# Patient Record
Sex: MALE | Race: WHITE | Employment: FULL TIME | URBAN - METROPOLITAN AREA
[De-identification: names, ages, dates, MRNs, and addresses within clinical notes are randomized per-mention and may not be internally consistent; named-entity substitution may affect disease eponyms.]

---

## 2017-03-08 ENCOUNTER — HOSPITAL ENCOUNTER (OUTPATIENT)
Age: 57
Setting detail: SPECIMEN
Discharge: HOME | End: 2017-03-08
Payer: COMMERCIAL

## 2017-03-10 LAB — DERMATOLOGY PATHOLOGY REPORT: NORMAL

## 2017-08-02 ENCOUNTER — HOSPITAL ENCOUNTER (OUTPATIENT)
Age: 57
Setting detail: SPECIMEN
Discharge: HOME | End: 2017-08-02
Payer: COMMERCIAL

## 2017-08-03 LAB — DERMATOLOGY PATHOLOGY REPORT: NORMAL

## 2017-09-07 ENCOUNTER — OFFICE VISIT (OUTPATIENT)
Dept: FAMILY MEDICINE CLINIC | Age: 57
End: 2017-09-07
Payer: COMMERCIAL

## 2017-09-07 VITALS
BODY MASS INDEX: 29.62 KG/M2 | SYSTOLIC BLOOD PRESSURE: 118 MMHG | DIASTOLIC BLOOD PRESSURE: 68 MMHG | WEIGHT: 211.6 LBS | OXYGEN SATURATION: 98 % | HEART RATE: 85 BPM | HEIGHT: 71 IN

## 2017-09-07 DIAGNOSIS — E78.2 ELEVATED TRIGLYCERIDES WITH HIGH CHOLESTEROL: ICD-10-CM

## 2017-09-07 DIAGNOSIS — R53.83 FATIGUE, UNSPECIFIED TYPE: ICD-10-CM

## 2017-09-07 DIAGNOSIS — Z23 NEED FOR PROPHYLACTIC VACCINATION AGAINST DIPHTHERIA-TETANUS-PERTUSSIS (DTP): Primary | ICD-10-CM

## 2017-09-07 DIAGNOSIS — N52.9 VASCULOGENIC ERECTILE DYSFUNCTION, UNSPECIFIED VASCULOGENIC ERECTILE DYSFUNCTION TYPE: ICD-10-CM

## 2017-09-07 DIAGNOSIS — R51.9 NONINTRACTABLE HEADACHE, UNSPECIFIED CHRONICITY PATTERN, UNSPECIFIED HEADACHE TYPE: ICD-10-CM

## 2017-09-07 DIAGNOSIS — C44.91 BCC (BASAL CELL CARCINOMA OF SKIN): ICD-10-CM

## 2017-09-07 PROCEDURE — 90715 TDAP VACCINE 7 YRS/> IM: CPT | Performed by: FAMILY MEDICINE

## 2017-09-07 PROCEDURE — 99205 OFFICE O/P NEW HI 60 MIN: CPT | Performed by: FAMILY MEDICINE

## 2017-09-07 PROCEDURE — 90471 IMMUNIZATION ADMIN: CPT | Performed by: FAMILY MEDICINE

## 2017-09-07 RX ORDER — TADALAFIL 5 MG/1
5 TABLET ORAL PRN
Qty: 9 TABLET | Refills: 11 | Status: SHIPPED | OUTPATIENT
Start: 2017-09-07 | End: 2019-01-22

## 2017-09-07 ASSESSMENT — PATIENT HEALTH QUESTIONNAIRE - PHQ9
2. FEELING DOWN, DEPRESSED OR HOPELESS: 0
1. LITTLE INTEREST OR PLEASURE IN DOING THINGS: 0
SUM OF ALL RESPONSES TO PHQ QUESTIONS 1-9: 0
SUM OF ALL RESPONSES TO PHQ9 QUESTIONS 1 & 2: 0

## 2017-09-08 ENCOUNTER — HOSPITAL ENCOUNTER (OUTPATIENT)
Age: 57
Discharge: HOME | End: 2017-09-08
Payer: COMMERCIAL

## 2017-09-08 DIAGNOSIS — E78.2 ELEVATED TRIGLYCERIDES WITH HIGH CHOLESTEROL: ICD-10-CM

## 2017-09-08 DIAGNOSIS — R53.83 FATIGUE, UNSPECIFIED TYPE: ICD-10-CM

## 2017-09-08 LAB
ABSOLUTE EOS #: 0.4 K/UL (ref 0–0.4)
ABSOLUTE LYMPH #: 2.1 K/UL (ref 1–4.8)
ABSOLUTE MONO #: 0.7 K/UL (ref 0.1–1.2)
ALBUMIN SERPL-MCNC: 4.4 G/DL (ref 3.5–5.2)
ALBUMIN/GLOBULIN RATIO: 1.4 (ref 1–2.5)
ALP BLD-CCNC: 52 U/L (ref 40–129)
ALT SERPL-CCNC: 97 U/L (ref 5–41)
ANION GAP SERPL CALCULATED.3IONS-SCNC: 11 MMOL/L (ref 9–17)
AST SERPL-CCNC: 67 U/L
BASOPHILS # BLD: 1 %
BASOPHILS ABSOLUTE: 0 K/UL (ref 0–0.2)
BILIRUB SERPL-MCNC: 1.25 MG/DL (ref 0.3–1.2)
BUN BLDV-MCNC: 21 MG/DL (ref 6–20)
BUN/CREAT BLD: ABNORMAL (ref 9–20)
CALCIUM SERPL-MCNC: 9.5 MG/DL (ref 8.6–10.4)
CHLORIDE BLD-SCNC: 100 MMOL/L (ref 98–107)
CHOLESTEROL/HDL RATIO: 5.9
CHOLESTEROL: 205 MG/DL
CO2: 26 MMOL/L (ref 20–31)
CREAT SERPL-MCNC: 0.93 MG/DL (ref 0.7–1.2)
DIFFERENTIAL TYPE: NORMAL
EOSINOPHILS RELATIVE PERCENT: 6 %
GFR AFRICAN AMERICAN: >60 ML/MIN
GFR NON-AFRICAN AMERICAN: >60 ML/MIN
GFR SERPL CREATININE-BSD FRML MDRD: ABNORMAL ML/MIN/{1.73_M2}
GFR SERPL CREATININE-BSD FRML MDRD: ABNORMAL ML/MIN/{1.73_M2}
GLUCOSE BLD-MCNC: 138 MG/DL (ref 70–99)
HCT VFR BLD CALC: 46.7 % (ref 41–53)
HDLC SERPL-MCNC: 35 MG/DL
HEMOGLOBIN: 16.1 G/DL (ref 13.5–17.5)
LDL CHOLESTEROL: 109 MG/DL (ref 0–130)
LYMPHOCYTES # BLD: 30 %
MCH RBC QN AUTO: 32.1 PG (ref 26–34)
MCHC RBC AUTO-ENTMCNC: 34.4 G/DL (ref 31–37)
MCV RBC AUTO: 93.4 FL (ref 80–100)
MONOCYTES # BLD: 10 %
PDW BLD-RTO: 13.7 % (ref 12.5–15.4)
PLATELET # BLD: 236 K/UL (ref 140–450)
PLATELET ESTIMATE: NORMAL
PMV BLD AUTO: 9.5 FL (ref 6–12)
POTASSIUM SERPL-SCNC: 4.7 MMOL/L (ref 3.7–5.3)
RBC # BLD: 5 M/UL (ref 4.5–5.9)
RBC # BLD: NORMAL 10*6/UL
SEG NEUTROPHILS: 53 %
SEGMENTED NEUTROPHILS ABSOLUTE COUNT: 3.8 K/UL (ref 1.8–7.7)
SODIUM BLD-SCNC: 137 MMOL/L (ref 135–144)
TOTAL PROTEIN: 7.6 G/DL (ref 6.4–8.3)
TRIGL SERPL-MCNC: 306 MG/DL
TSH SERPL DL<=0.05 MIU/L-ACNC: 4.64 MIU/L (ref 0.3–5)
VLDLC SERPL CALC-MCNC: ABNORMAL MG/DL (ref 1–30)
WBC # BLD: 7 K/UL (ref 3.5–11)
WBC # BLD: NORMAL 10*3/UL

## 2017-09-08 PROCEDURE — 84443 ASSAY THYROID STIM HORMONE: CPT

## 2017-09-08 PROCEDURE — 36415 COLL VENOUS BLD VENIPUNCTURE: CPT

## 2017-09-08 PROCEDURE — 80053 COMPREHEN METABOLIC PANEL: CPT

## 2017-09-08 PROCEDURE — 80061 LIPID PANEL: CPT

## 2017-09-08 PROCEDURE — 85025 COMPLETE CBC W/AUTO DIFF WBC: CPT

## 2017-09-11 DIAGNOSIS — R73.09 ELEVATED GLUCOSE: ICD-10-CM

## 2017-09-11 DIAGNOSIS — R79.89 ELEVATED LFTS: Primary | ICD-10-CM

## 2017-09-13 DIAGNOSIS — R73.09 ELEVATED GLUCOSE: Primary | ICD-10-CM

## 2017-09-25 ENCOUNTER — TELEPHONE (OUTPATIENT)
Dept: FAMILY MEDICINE CLINIC | Age: 57
End: 2017-09-25

## 2017-09-26 ENCOUNTER — HOSPITAL ENCOUNTER (OUTPATIENT)
Age: 57
Discharge: HOME | End: 2017-09-26
Payer: COMMERCIAL

## 2017-09-26 DIAGNOSIS — R73.09 ELEVATED GLUCOSE: ICD-10-CM

## 2017-09-26 DIAGNOSIS — R79.89 ELEVATED LFTS: ICD-10-CM

## 2017-09-26 LAB
ALBUMIN SERPL-MCNC: 4.2 G/DL (ref 3.5–5.2)
ALBUMIN/GLOBULIN RATIO: 1.3 (ref 1–2.5)
ALP BLD-CCNC: 47 U/L (ref 40–129)
ALT SERPL-CCNC: 57 U/L (ref 5–41)
ANION GAP SERPL CALCULATED.3IONS-SCNC: 13 MMOL/L (ref 9–17)
AST SERPL-CCNC: 43 U/L
BILIRUB SERPL-MCNC: 1.7 MG/DL (ref 0.3–1.2)
BUN BLDV-MCNC: 16 MG/DL (ref 6–20)
BUN/CREAT BLD: ABNORMAL (ref 9–20)
CALCIUM SERPL-MCNC: 9.5 MG/DL (ref 8.6–10.4)
CHLORIDE BLD-SCNC: 102 MMOL/L (ref 98–107)
CO2: 24 MMOL/L (ref 20–31)
CREAT SERPL-MCNC: 1.01 MG/DL (ref 0.7–1.2)
ESTIMATED AVERAGE GLUCOSE: 140 MG/DL
GFR AFRICAN AMERICAN: >60 ML/MIN
GFR NON-AFRICAN AMERICAN: >60 ML/MIN
GFR SERPL CREATININE-BSD FRML MDRD: ABNORMAL ML/MIN/{1.73_M2}
GFR SERPL CREATININE-BSD FRML MDRD: ABNORMAL ML/MIN/{1.73_M2}
GLUCOSE BLD-MCNC: 135 MG/DL (ref 70–99)
HBA1C MFR BLD: 6.5 % (ref 4–6)
POTASSIUM SERPL-SCNC: 4.8 MMOL/L (ref 3.7–5.3)
SODIUM BLD-SCNC: 139 MMOL/L (ref 135–144)
TOTAL PROTEIN: 7.4 G/DL (ref 6.4–8.3)

## 2017-09-26 PROCEDURE — 83036 HEMOGLOBIN GLYCOSYLATED A1C: CPT

## 2017-09-26 PROCEDURE — 80053 COMPREHEN METABOLIC PANEL: CPT

## 2017-09-26 PROCEDURE — 36415 COLL VENOUS BLD VENIPUNCTURE: CPT

## 2017-09-27 PROBLEM — E11.9 TYPE 2 DIABETES MELLITUS WITHOUT COMPLICATION, WITHOUT LONG-TERM CURRENT USE OF INSULIN (HCC): Chronic | Status: ACTIVE | Noted: 2017-09-27

## 2017-09-28 DIAGNOSIS — E11.9 TYPE 2 DIABETES MELLITUS WITHOUT COMPLICATION, WITHOUT LONG-TERM CURRENT USE OF INSULIN (HCC): Primary | Chronic | ICD-10-CM

## 2017-09-29 ENCOUNTER — OFFICE VISIT (OUTPATIENT)
Dept: FAMILY MEDICINE CLINIC | Age: 57
End: 2017-09-29
Payer: COMMERCIAL

## 2017-09-29 ENCOUNTER — HOSPITAL ENCOUNTER (OUTPATIENT)
Age: 57
Setting detail: SPECIMEN
Discharge: HOME | End: 2017-09-29
Payer: COMMERCIAL

## 2017-09-29 VITALS
DIASTOLIC BLOOD PRESSURE: 88 MMHG | HEIGHT: 71 IN | SYSTOLIC BLOOD PRESSURE: 120 MMHG | BODY MASS INDEX: 29.6 KG/M2 | WEIGHT: 211.4 LBS | HEART RATE: 82 BPM | OXYGEN SATURATION: 98 %

## 2017-09-29 DIAGNOSIS — N40.1 BENIGN NON-NODULAR PROSTATIC HYPERPLASIA WITH LOWER URINARY TRACT SYMPTOMS: ICD-10-CM

## 2017-09-29 DIAGNOSIS — E11.9 TYPE 2 DIABETES MELLITUS WITHOUT COMPLICATION, WITHOUT LONG-TERM CURRENT USE OF INSULIN (HCC): Primary | ICD-10-CM

## 2017-09-29 DIAGNOSIS — M17.11 PRIMARY OSTEOARTHRITIS OF RIGHT KNEE: ICD-10-CM

## 2017-09-29 LAB — PROSTATE SPECIFIC ANTIGEN: 1.88 UG/L

## 2017-09-29 PROCEDURE — 99214 OFFICE O/P EST MOD 30 MIN: CPT | Performed by: FAMILY MEDICINE

## 2017-09-29 RX ORDER — DICLOFENAC SODIUM 75 MG/1
75 TABLET, DELAYED RELEASE ORAL 2 TIMES DAILY WITH MEALS
Qty: 60 TABLET | Refills: 11 | Status: SHIPPED | OUTPATIENT
Start: 2017-09-29 | End: 2019-01-22

## 2017-09-29 RX ORDER — TAMSULOSIN HYDROCHLORIDE 0.4 MG/1
0.4 CAPSULE ORAL DAILY
Qty: 30 CAPSULE | Refills: 11 | Status: SHIPPED | OUTPATIENT
Start: 2017-09-29 | End: 2019-01-22

## 2017-10-24 ENCOUNTER — HOSPITAL ENCOUNTER (OUTPATIENT)
Dept: DIABETES SERVICES | Age: 57
Setting detail: THERAPIES SERIES
Discharge: HOME | End: 2017-10-24
Payer: COMMERCIAL

## 2017-10-24 VITALS
HEART RATE: 70 BPM | WEIGHT: 210.76 LBS | HEIGHT: 70 IN | BODY MASS INDEX: 30.17 KG/M2 | SYSTOLIC BLOOD PRESSURE: 125 MMHG | DIASTOLIC BLOOD PRESSURE: 80 MMHG

## 2017-10-24 DIAGNOSIS — E11.9 TYPE 2 DIABETES MELLITUS WITHOUT COMPLICATION, WITHOUT LONG-TERM CURRENT USE OF INSULIN (HCC): Primary | ICD-10-CM

## 2017-10-24 PROCEDURE — G0108 DIAB MANAGE TRN  PER INDIV: HCPCS

## 2017-10-24 NOTE — PROGRESS NOTES
Taking an Asprin daily? []Yes   [x]No          Diabetes Self- Management Education Record    Participant Name: Dominguez Ramos  Referring Provider: Alexa Dobbins MD   Assessment/Evaluation Ratings:  1=Needs Instruction   4=Demonstrates Understanding/Competency  2=Needs Review   NC=Not Covered    3=Comprehends Key Points  N/A=Not Applicable  Topics/Learning Objectives Pre-session Assess Date:  10/24/17 Nikhil Davis. Date Reinforce Date Post- session Eval Comments   Diabetes disease process & Treatment process: Define diabetes & pre-diabetes; Identify own type of diabetes; role of the pancreas; signs/symptoms; diagnostic criteria; prevention & treatment options; contributing factors. 1    10/24/17 JW Pt's mom has diabetes. Recently dx, hgba1c 6.5%, MD said he could try lifestyle changes before meds and SMBG. Pt motivated to make changes-has made several already. Finds this good reason to be healthier. Has lose wt in past with challenge at work. Incorporating nutritional management into lifestyle: Describe effect of type, amount & timing of food on blood glucose; Describe basic meal planning techniques & current nutrition guideline   1- , lives alone doesn't want to do involved cooking but eats 3 meals with only snack at HS. Eat out about 2x/week but no fast food. Likes [employer] and [employer] salad bar. Avoids sugary beverages. What to eat - Food groups, When to eat - timing of meals and snacks, and How much to eat - portions control. calories/ day   CHO choices/ meal   CHO choices/  day   grams of protein /day   gram of fat /day     Correctly read food labels & demonstrate CHO counting & portion control with personalized meal plan. Identify dining out strategies, & dietary sick day guidelines. 1       Incorporating physical activity into lifestyle:   Verbalize effect of exercise on blood glucose levels; benefits of regular exercise; safety considerations; contraindications; maintenance of activity.    1 Identify 7 self-care behaviors; Personal health risk factors; Benefits, challenges & strategies for behavioral change;    1         Individualized goal selection. 1     My goal , to help me improve my health, I will:   1. Eat 3 meals a day and follow my meal plan  2. Plan  Follow-up Appointments planned in individual and group setting. (Wanted to get started, then needs night sessions, lives in [facility]- will do April night classes)  Next Appointment in 1 weeks. Instruction Method: [x]Lecture/Discussion  []Power Point Presentation  [x]Handouts  []Return Demonstration      Education Materials/Equipment Provided:    [x]Self-Management - Initial assessment - Enrolment in to ADA  Where do I Begin, Living with Type 2 diabetes ADA home support program and handout for no concentrated sweets and diet meal planning basics, handout on diabetes education classes.  10/24/17 JW    []Self-Management  Class 1 - Krish Living Well with Diabetes Booklet and Healthy i On the Road to better managing your diabetes map handouts    [] Self-Management  Class 2 - Meal Plan and handout for serving sizes, smarter snacking, Ready Set Carb Counting / Plate Method, Nutrient Conversion and [facility] Booklet Healthy Eating for People with Diabetes and Nutrition in the [employer] - fast facts about fast food    [] Self-Management  Class 3 -  Diabetes ID card,  foot care tips sheet, Healthy I  Continuing Your Journey with Diabetes map handout, Individualized Diabetes report card    [] Self-Management Class 4 - BD Booklet  Sick Day Rules and  Dinning Out Guide , recipe hand outs and tips, diabetes Cookbooks  ( when available)     []Self-Management - 3 month follow - up  AADE booklet Side by Side a partnership approach to diabetes self- care, [address] [place] [address] Diabetes support program information sheet, [facility] [place] information sheet       []Self-Management  Gestational

## 2017-11-02 ENCOUNTER — HOSPITAL ENCOUNTER (OUTPATIENT)
Dept: DIABETES SERVICES | Age: 57
Setting detail: THERAPIES SERIES
Discharge: HOME | End: 2017-11-02
Payer: COMMERCIAL

## 2017-11-02 DIAGNOSIS — E11.9 TYPE 2 DIABETES MELLITUS WITHOUT COMPLICATION, WITHOUT LONG-TERM CURRENT USE OF INSULIN (HCC): Primary | ICD-10-CM

## 2017-11-02 PROCEDURE — G0108 DIAB MANAGE TRN  PER INDIV: HCPCS

## 2017-11-02 NOTE — PROGRESS NOTES
Incorporating physical activity into lifestyle:   Verbalize effect of exercise on blood glucose levels; benefits of regular exercise; safety considerations; contraindications; maintenance of activity. 1 11/2/17RS   10/24/17 SAROJ Has membership to [employer], uses in winter. Likes to walk, play golf and running about 3x/week. Most recently not running d/t fatigue and arthritis in knee. Using medications safely:  Identify effects of diabetes medicines on blood glucose levels; List diabetes medication taken, action & side effects;    1    Wants to avoid medication if possible    11/2/17RS- discussed metformin role   Insulin / Injectable - Appropriate injection sites; proper storage; supplies needed; proper technique; safe needle disposal guidelines. n/a       Monitoring blood glucose, interpreting and using results:  Identify recommended & personal blood glucose targets; importance of testing; testing supplies; HgbA1C target levels; Factors affecting blood glucose; Importance of logging blood glucose levels for pattern recognition; ketone testing; safe lancet disposal.   1 11/2/17RS   Dr. Yaneth Kelsey have him testing, doing repeat hgba1c in December then go from there. Patient stated after education on BG targets, he may purchase own BG meter to do some self monitoring - to see his own BG trends - 11/2/17RS   Prevention, detection & treatment of acute complications:  Identify symptoms of hyper & hypoglycemia, and prevention & treatment strategies. 1 11/2/17RS      Describe sick day guidelines & indications for  physician notification. Identify short term consequences of poor control. 1       Prevention, detection & treatment of chronic complications:  Define the natural course of diabetes & describe the relationship of blood glucose levels to long term complications of diabetes. Identify preventative measures & standards of care.    1    Stated long standing high triglycerides and fatty liver11/2/17RS-- Developing strategies to address psychosocial issues:  Describe feelings about living with diabetes; Describe how stress, depression & anxiety affect blood glucose; Identify coping strategies; Identify support needed & support network available. 1 11/2/17RS   + attitude, motivated for taking care of his health. 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 Developing strategies to promote health/change behavior: Identify 7 self-care behaviors; Personal health risk factors; Benefits, challenges & strategies for behavioral change;    1 11/2/17RS        Individualized goal selection. 1     My goal , to help me improve my health, I will:   1. Eat 3 meals a day and follow my meal plan  2. Plan  Follow-up Appointments planned in individual and group setting. (Wanted to get started, then needs night sessions, [facility]- will do April night classes)  Next Appointment in 1 weeks. Instruction Method: [x]Lecture/Discussion  []Power Point Presentation  [x]Handouts  []Return Demonstration      Education Materials/Equipment Provided:    [x]Self-Management - Initial assessment - Enrolment in to ADA  Where do I Begin, Living with Type 2 diabetes ADA home support program and handout for no concentrated sweets and diet meal planning basics, handout on diabetes education classes.  10/24/17 JW    [x]Self-Management  Class 1 - Krish Living Well with Diabetes Booklet and Healthy i On the Road to better managing your diabetes map [id number]/2/17RS--    [] Self-Management  Class 2 - Meal Plan and handout for serving sizes, smarter snacking, Ready Set Carb Counting / Plate Method, Nutrient Conversion and International Diabetes [address] Eating for People with Diabetes and Nutrition in the [employer] Resources - fast facts about fast food    [] Self-Management  Class 3 -  Diabetes ID card,  foot care tips sheet, Healthy I 

## 2017-11-28 ENCOUNTER — HOSPITAL ENCOUNTER (OUTPATIENT)
Dept: DIABETES SERVICES | Age: 57
Setting detail: THERAPIES SERIES
Discharge: HOME | End: 2017-11-28
Payer: COMMERCIAL

## 2017-11-28 DIAGNOSIS — E11.9 TYPE 2 DIABETES MELLITUS WITHOUT COMPLICATION, WITHOUT LONG-TERM CURRENT USE OF INSULIN (HCC): Primary | ICD-10-CM

## 2017-11-28 PROCEDURE — G0108 DIAB MANAGE TRN  PER INDIV: HCPCS

## 2017-11-28 NOTE — PROGRESS NOTES
Diabetes Self- Management Education Programg  Patient, Sukhdev Nevarez,  here for diabetes self-management education  visit/ assessment. Today's visit was in an individual setting. Diet History :  Diet Questionnaire and typical meal /portion sheet completed  [x]Yes  [] NO    Goals setting:  Goal work sheet completed  [x]Yes  [] NO  (SEE  EDUCATION AND GOALS FLOWSHEET)    MEDICAL HISTORY:  No past medical history on file. Family History   Problem Relation Age of Onset    Diabetes Mother      type 2     Cancer Son      lymphoma,  from the disease at 29    Prostate Cancer Paternal Grandfather 72    Heart Disease Neg Hx     Stroke Neg Hx     Bleeding Prob Neg Hx     Clotting Disorder Neg Hx     Thyroid Disease Neg Hx     Osteoporosis Neg Hx      Review of patient's allergies indicates no known allergies. Immunization History   Administered Date(s) Administered    Tdap (Boostrix, Adacel) 2017     Current Medications  Current Outpatient Prescriptions   Medication Sig Dispense Refill    tamsulosin (FLOMAX) 0.4 MG capsule Take 1 capsule by mouth daily 30 capsule 11    diclofenac (VOLTAREN) 75 MG EC tablet Take 1 tablet by mouth 2 times daily (with meals) 60 tablet 11    tadalafil (CIALIS) 5 MG tablet Take 1 tablet by mouth as needed for Erectile Dysfunction 9 tablet 11     No current facility-administered medications for this encounter.    :     Comments:  Allergies:  No Known Allergies  Diabetes 5  / Health Status    A1C blood level - at goal < 7%   Lab Results   Component Value Date    LABA1C 6.5 (H) 2017     Lab Results   Component Value Date    CREATININE 1.01 2017       Blood pressure ( 130/ 80)  Or less  BP Readings from Last 3 Encounters:   10/24/17 125/80   17 120/88   17 118/68        Cholesterol ( LDL under  100)   Lab Results   Component Value Date    LDLCHOLESTEROL 109 2017       4 . Smoking ? []Yes   [x]No    5. Taking an Asprin daily?   []Yes   [x]No sick day guidelines. 1 11/28/17 SAROJ      Incorporating physical activity into lifestyle:   Verbalize effect of exercise on blood glucose levels; benefits of regular exercise; safety considerations; contraindications; maintenance of activity. 1 11/2/17RS   10/24/17 SAROJ Has membership to [employer], uses in winter. Likes to walk, play golf and running about 3x/week. Most recently not running d/t fatigue and arthritis in knee. Using medications safely:  Identify effects of diabetes medicines on blood glucose levels; List diabetes medication taken, action & side effects;    1    Wants to avoid medication if possible    11/2/17RS- discussed metformin role   Insulin / Injectable - Appropriate injection sites; proper storage; supplies needed; proper technique; safe needle disposal guidelines. n/a       Monitoring blood glucose, interpreting and using results:  Identify recommended & personal blood glucose targets; importance of testing; testing supplies; HgbA1C target levels; Factors affecting blood glucose; Importance of logging blood glucose levels for pattern recognition; ketone testing; safe lancet disposal.   1 11/2/17RS   Dr. Ruel Du have him testing, doing repeat hgba1c in December then go from there. Patient stated after education on BG targets, he may purchase own BG meter to do some self monitoring - to see his own BG trends - 11/2/17RS   Prevention, detection & treatment of acute complications:  Identify symptoms of hyper & hypoglycemia, and prevention & treatment strategies. 1 11/2/17RS      Describe sick day guidelines & indications for  physician notification. Identify short term consequences of poor control. 1       Prevention, detection & treatment of chronic complications:  Define the natural course of diabetes & describe the relationship of blood glucose levels to long term complications of diabetes. Identify preventative measures & standards of care.    1    Stated long standing high triglycerides and fatty liver11/2/17RS--   Developing strategies to address psychosocial issues:  Describe feelings about living with diabetes; Describe how stress, depression & anxiety affect blood glucose; Identify coping strategies; Identify support needed & support network available. 1 11/2/17RS   + attitude, motivated for taking care of his health. 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 Developing strategies to promote health/change behavior: Identify 7 self-care behaviors; Personal health risk factors; Benefits, challenges & strategies for behavioral change;    1 11/2/17RS        Individualized goal selection. 1     My goal , to help me improve my health, I will:   1. Eat 3 meals a day and follow my meal plan  2. Plan  Follow-up Appointments planned in individual and group setting. (Wanted to get started, then needs night sessions, lives in [facility]- will do April night classes)  Next Appointment in 1 weeks. Instruction Method: [x]Lecture/Discussion  []Power Point Presentation  [x]Handouts  []Return Demonstration      Education Materials/Equipment Provided:    [x]Self-Management - Initial assessment - Enrolment in to ADA  Where do I Begin, Living with Type 2 diabetes ADA home support program and handout for no concentrated sweets and diet meal planning basics, handout on diabetes education classes.  10/24/17 SAROJ    [x]Self-Management  Class 1 - Krish Living Well with Diabetes Booklet and Healthy i On the Road to better managing your diabetes map [id number]/2/17RS--    [x] Self-Management  Class 2 - Meal Plan and handout for serving sizes, smarter snacking, Ready Set Carb Counting / Plate Method, Nutrient Conversion and International Diabetes [address] Eating for People with Diabetes and Nutrition in the [employer] Resources - fast facts about fast food11/28/17 JW    [] Self-Management  Class 3 -  Diabetes [address] [address]       [] Starting [employer] program /  [employer] [phone]    []  Support Group / Nehemiah Alexandra of [address] [place] [address] Drive 8 week diabetes education support   classes [place] [phone]      []   1[employer] application  / scholarship program     []ADA care 4 life chacorta      []  Internet web sites - ADA and D- life    Post Education Referrals:      [] [address] [place] [address] information sheet and [address] [facility] , [address]       [] Dental care - [facility] care of [employer] or corner dental clinics    [] [facility]) link  phone number - for information and referral to [facility]  Clinically  [address], FOOT, CARDIAC, WOUND, WEIGHT MANAGEMENT        []Other  Bryan Leach, RD, LD

## 2017-12-20 ENCOUNTER — HOSPITAL ENCOUNTER (OUTPATIENT)
Age: 57
Discharge: HOME | End: 2017-12-20
Payer: COMMERCIAL

## 2017-12-20 DIAGNOSIS — E11.9 TYPE 2 DIABETES MELLITUS WITHOUT COMPLICATION, WITHOUT LONG-TERM CURRENT USE OF INSULIN (HCC): Chronic | ICD-10-CM

## 2017-12-20 LAB
ESTIMATED AVERAGE GLUCOSE: 120 MG/DL
HBA1C MFR BLD: 5.8 % (ref 4–6)

## 2017-12-20 PROCEDURE — 83036 HEMOGLOBIN GLYCOSYLATED A1C: CPT

## 2017-12-20 PROCEDURE — 36415 COLL VENOUS BLD VENIPUNCTURE: CPT

## 2017-12-21 ENCOUNTER — OFFICE VISIT (OUTPATIENT)
Dept: FAMILY MEDICINE CLINIC | Age: 57
End: 2017-12-21
Payer: COMMERCIAL

## 2017-12-21 VITALS
DIASTOLIC BLOOD PRESSURE: 84 MMHG | WEIGHT: 205 LBS | OXYGEN SATURATION: 96 % | HEART RATE: 85 BPM | SYSTOLIC BLOOD PRESSURE: 110 MMHG | BODY MASS INDEX: 29.41 KG/M2

## 2017-12-21 DIAGNOSIS — Z72.89 OTHER PROBLEMS RELATED TO LIFESTYLE: Primary | ICD-10-CM

## 2017-12-21 DIAGNOSIS — R73.01 IFG (IMPAIRED FASTING GLUCOSE): ICD-10-CM

## 2017-12-21 PROCEDURE — 99213 OFFICE O/P EST LOW 20 MIN: CPT | Performed by: FAMILY MEDICINE

## 2017-12-21 NOTE — PROGRESS NOTES
respiratory retractions noted. Wall has symmetrical movement with respirations. Assessment:   Encounter Diagnoses   Name Primary?     Other problems related to lifestyle Yes    IFG (impaired fasting glucose)          Plan:   rv in 6 months    Orders Placed This Encounter   Procedures    HIV Screen     Standing Status:   Future     Standing Expiration Date:   12/21/2018    Hepatitis C Antibody     Standing Status:   Future     Standing Expiration Date:   12/21/2018    Basic Metabolic Panel     Standing Status:   Future     Standing Expiration Date:   12/21/2018    Hemoglobin A1C     Standing Status:   Future     Standing Expiration Date:   12/21/2018

## 2018-01-09 ENCOUNTER — HOSPITAL ENCOUNTER (OUTPATIENT)
Dept: DIABETES SERVICES | Age: 58
Setting detail: THERAPIES SERIES
Discharge: HOME | End: 2018-01-09
Payer: COMMERCIAL

## 2018-04-19 ENCOUNTER — HOSPITAL ENCOUNTER (OUTPATIENT)
Dept: DIABETES SERVICES | Age: 58
Setting detail: THERAPIES SERIES
Discharge: HOME | End: 2018-04-19
Payer: COMMERCIAL

## 2018-04-19 PROCEDURE — G0109 DIAB MANAGE TRN IND/GROUP: HCPCS

## 2018-04-26 ENCOUNTER — HOSPITAL ENCOUNTER (OUTPATIENT)
Dept: DIABETES SERVICES | Age: 58
Setting detail: THERAPIES SERIES
Discharge: HOME | End: 2018-04-26
Payer: COMMERCIAL

## 2018-04-26 DIAGNOSIS — E11.9 TYPE 2 DIABETES MELLITUS WITHOUT COMPLICATION, WITHOUT LONG-TERM CURRENT USE OF INSULIN (HCC): Primary | ICD-10-CM

## 2018-04-26 PROCEDURE — G0109 DIAB MANAGE TRN IND/GROUP: HCPCS

## 2018-06-19 ENCOUNTER — HOSPITAL ENCOUNTER (OUTPATIENT)
Age: 58
Discharge: HOME | End: 2018-06-19
Payer: COMMERCIAL

## 2018-06-19 DIAGNOSIS — R73.01 IFG (IMPAIRED FASTING GLUCOSE): ICD-10-CM

## 2018-06-19 DIAGNOSIS — Z72.89 OTHER PROBLEMS RELATED TO LIFESTYLE: ICD-10-CM

## 2018-06-19 LAB
ANION GAP SERPL CALCULATED.3IONS-SCNC: 10 MMOL/L (ref 9–17)
BUN BLDV-MCNC: 15 MG/DL (ref 6–20)
BUN/CREAT BLD: ABNORMAL (ref 9–20)
CALCIUM SERPL-MCNC: 9.4 MG/DL (ref 8.6–10.4)
CHLORIDE BLD-SCNC: 102 MMOL/L (ref 98–107)
CO2: 26 MMOL/L (ref 20–31)
CREAT SERPL-MCNC: 1.05 MG/DL (ref 0.7–1.2)
ESTIMATED AVERAGE GLUCOSE: 120 MG/DL
GFR AFRICAN AMERICAN: >60 ML/MIN
GFR NON-AFRICAN AMERICAN: >60 ML/MIN
GFR SERPL CREATININE-BSD FRML MDRD: ABNORMAL ML/MIN/{1.73_M2}
GFR SERPL CREATININE-BSD FRML MDRD: ABNORMAL ML/MIN/{1.73_M2}
GLUCOSE BLD-MCNC: 116 MG/DL (ref 70–99)
HBA1C MFR BLD: 5.8 % (ref 4–6)
HEPATITIS C ANTIBODY: NONREACTIVE
HIV AG/AB: NONREACTIVE
POTASSIUM SERPL-SCNC: 4.8 MMOL/L (ref 3.7–5.3)
SODIUM BLD-SCNC: 138 MMOL/L (ref 135–144)

## 2018-06-19 PROCEDURE — 86803 HEPATITIS C AB TEST: CPT

## 2018-06-19 PROCEDURE — 83036 HEMOGLOBIN GLYCOSYLATED A1C: CPT

## 2018-06-19 PROCEDURE — 87389 HIV-1 AG W/HIV-1&-2 AB AG IA: CPT

## 2018-06-19 PROCEDURE — 36415 COLL VENOUS BLD VENIPUNCTURE: CPT

## 2018-06-19 PROCEDURE — 80048 BASIC METABOLIC PNL TOTAL CA: CPT

## 2019-01-22 ENCOUNTER — OFFICE VISIT (OUTPATIENT)
Dept: FAMILY MEDICINE CLINIC | Age: 59
End: 2019-01-22
Payer: COMMERCIAL

## 2019-01-22 VITALS
SYSTOLIC BLOOD PRESSURE: 116 MMHG | DIASTOLIC BLOOD PRESSURE: 86 MMHG | WEIGHT: 212 LBS | HEART RATE: 71 BPM | BODY MASS INDEX: 30.42 KG/M2 | OXYGEN SATURATION: 96 %

## 2019-01-22 DIAGNOSIS — H61.21 IMPACTED CERUMEN OF RIGHT EAR: ICD-10-CM

## 2019-01-22 DIAGNOSIS — H93.11 TINNITUS OF RIGHT EAR: ICD-10-CM

## 2019-01-22 DIAGNOSIS — Z23 NEED FOR INFLUENZA VACCINATION: Primary | ICD-10-CM

## 2019-01-22 DIAGNOSIS — H93.8X1 EAR PRESSURE, RIGHT: ICD-10-CM

## 2019-01-22 PROCEDURE — 69210 REMOVE IMPACTED EAR WAX UNI: CPT | Performed by: FAMILY MEDICINE

## 2019-01-22 PROCEDURE — 90471 IMMUNIZATION ADMIN: CPT | Performed by: FAMILY MEDICINE

## 2019-01-22 PROCEDURE — 99213 OFFICE O/P EST LOW 20 MIN: CPT | Performed by: FAMILY MEDICINE

## 2019-01-22 PROCEDURE — 90688 IIV4 VACCINE SPLT 0.5 ML IM: CPT | Performed by: FAMILY MEDICINE

## 2019-01-22 ASSESSMENT — PATIENT HEALTH QUESTIONNAIRE - PHQ9
1. LITTLE INTEREST OR PLEASURE IN DOING THINGS: 0
SUM OF ALL RESPONSES TO PHQ QUESTIONS 1-9: 0
SUM OF ALL RESPONSES TO PHQ QUESTIONS 1-9: 0
SUM OF ALL RESPONSES TO PHQ9 QUESTIONS 1 & 2: 0
2. FEELING DOWN, DEPRESSED OR HOPELESS: 0

## 2019-07-01 ENCOUNTER — TELEPHONE (OUTPATIENT)
Dept: FAMILY MEDICINE CLINIC | Age: 59
End: 2019-07-01

## 2019-07-25 ENCOUNTER — HOSPITAL ENCOUNTER (OUTPATIENT)
Age: 59
Setting detail: SPECIMEN
Discharge: HOME | End: 2019-07-25
Payer: COMMERCIAL

## 2019-07-29 LAB — DERMATOLOGY PATHOLOGY REPORT: NORMAL

## 2019-09-10 ENCOUNTER — OFFICE VISIT (OUTPATIENT)
Dept: FAMILY MEDICINE CLINIC | Age: 59
End: 2019-09-10
Payer: COMMERCIAL

## 2019-09-10 VITALS
WEIGHT: 215.5 LBS | DIASTOLIC BLOOD PRESSURE: 104 MMHG | SYSTOLIC BLOOD PRESSURE: 150 MMHG | HEIGHT: 71 IN | BODY MASS INDEX: 30.17 KG/M2 | HEART RATE: 70 BPM | OXYGEN SATURATION: 96 %

## 2019-09-10 DIAGNOSIS — E78.2 ELEVATED TRIGLYCERIDES WITH HIGH CHOLESTEROL: Chronic | ICD-10-CM

## 2019-09-10 DIAGNOSIS — Z00.00 WELL ADULT EXAM: Primary | ICD-10-CM

## 2019-09-10 PROCEDURE — 99396 PREV VISIT EST AGE 40-64: CPT | Performed by: FAMILY MEDICINE

## 2019-09-10 PROCEDURE — 90732 PPSV23 VACC 2 YRS+ SUBQ/IM: CPT | Performed by: FAMILY MEDICINE

## 2019-09-10 PROCEDURE — 90471 IMMUNIZATION ADMIN: CPT | Performed by: FAMILY MEDICINE

## 2019-09-10 NOTE — PROGRESS NOTES
appears normal.        ASSESSMENT       Encounter Diagnoses   Name Primary?  Well adult exam Yes    Elevated triglycerides with high cholesterol          PLAN      Discussed controversies of prostate cancer screening and hanoudt given    Discussed shingrix vaccine. There are no discontinued medications. THE ABOVE NOTED DISCONTINUED MEDS MAY ONLY BE FROM 'CLEANING UP' THE MED LIST AND WERE NOT ACTUALLY CANCELED;  SEE CHART FOR DETAILS! No orders of the defined types were placed in this encounter. Orders Placed This Encounter   Procedures    Pneumococcal polysaccharide vaccine 23-valent greater than or equal to 3yo subcutaneous/IM    Magnesium     Standing Status:   Future     Standing Expiration Date:   9/10/2020    CBC Auto Differential     Standing Status:   Future     Standing Expiration Date:   9/10/2020    Comprehensive Metabolic Panel     Standing Status:   Future     Standing Expiration Date:   9/10/2020    Lipid Panel     Standing Status:   Future     Standing Expiration Date:   9/10/2020     Order Specific Question:   Is Patient Fasting?/# of Hours     Answer:   8 hour fasting (no calories)    TSH with Reflex     Standing Status:   Future     Standing Expiration Date:   9/10/2020     No follow-ups on file. Patient Instructions       [email or web address]  _____________________________________________________________        American Cancer Society  American Cancer Society recommendations for prostate cancer early detection  The American Cancer Society (ACS) recommends that men have a chance to make an informed decision with their health care provider about whether to be screened for prostate cancer.  The decision should be made after of screening, a routine screening interval of two years or more may be preferred over annual screening in those men who have participated in shared decision-making and decided on screening. As compared to annual screening, it is expected that screening intervals of two years preserve the majority of the benefits and reduce overdiagnosis and false positives. (Option; Evidence Strength Grade C)   Additionally, intervals for rescreening can be individualized by a baseline PSA level. Guideline Statement 5: The Panel does not recommend routine PSA screening in men age 74+ years or any man with less than a 10 to 13 year life expectancy. (Recommendation; Evidence Strength Grade C)   Some men age 74+ years who are in excellent health may benefit from prostate cancer screening. [email or web address]. [facility]          As of 5/15/2018, the USPSTF has updated their recommendation, see above for details                  \"The autopsy studies of Bhavin and, subsequently, several other authors demonstrated the omnipresence of prostate cancer in elderly men, an observation at odds with the relatively low lifetime risk of prostate cancer mortality. The early identification of prostate cancer is worthwhile only if it detects potentially life-threatening tumours among asymptomatic men at a stage when lesions are curable, and if the balance of evidence demonstrates that the prospect of benefit outweighs the potential for harm. 5,42,73 The rosario dilemma is that, although most cancers detected by screening are clinically confined to the prostate and hence potentially curable, current screening tests cannot differentiate between the majority of screen-detected cancers that have low biological likelihood of progression (for which radical treatments would probably be unnecessary) from those with aggressive potential, in whom early radical treatment might be beneficial.6,25 Seventy[age]one years since

## 2019-09-14 ENCOUNTER — HOSPITAL ENCOUNTER (OUTPATIENT)
Age: 59
Discharge: HOME | End: 2019-09-14
Payer: COMMERCIAL

## 2019-09-14 DIAGNOSIS — E78.2 ELEVATED TRIGLYCERIDES WITH HIGH CHOLESTEROL: Chronic | ICD-10-CM

## 2019-09-14 DIAGNOSIS — Z00.00 WELL ADULT EXAM: ICD-10-CM

## 2019-09-14 LAB
ABSOLUTE EOS #: 0.27 K/UL (ref 0–0.44)
ABSOLUTE IMMATURE GRANULOCYTE: 0.04 K/UL (ref 0–0.3)
ABSOLUTE LYMPH #: 1.59 K/UL (ref 1.1–3.7)
ABSOLUTE MONO #: 0.49 K/UL (ref 0.1–1.2)
ALBUMIN SERPL-MCNC: 4.4 G/DL (ref 3.5–5.2)
ALBUMIN/GLOBULIN RATIO: 1.5 (ref 1–2.5)
ALP BLD-CCNC: 62 U/L (ref 40–129)
ALT SERPL-CCNC: 60 U/L (ref 5–41)
ANION GAP SERPL CALCULATED.3IONS-SCNC: 15 MMOL/L (ref 9–17)
AST SERPL-CCNC: 41 U/L
BASOPHILS # BLD: 1 % (ref 0–2)
BASOPHILS ABSOLUTE: 0.05 K/UL (ref 0–0.2)
BILIRUB SERPL-MCNC: 1.29 MG/DL (ref 0.3–1.2)
BUN BLDV-MCNC: 18 MG/DL (ref 8–23)
BUN/CREAT BLD: ABNORMAL (ref 9–20)
CALCIUM SERPL-MCNC: 9.7 MG/DL (ref 8.6–10.4)
CHLORIDE BLD-SCNC: 104 MMOL/L (ref 98–107)
CHOLESTEROL/HDL RATIO: 5.7
CHOLESTEROL: 206 MG/DL
CO2: 22 MMOL/L (ref 20–31)
CREAT SERPL-MCNC: 1.11 MG/DL (ref 0.7–1.2)
DIFFERENTIAL TYPE: ABNORMAL
EOSINOPHILS RELATIVE PERCENT: 5 % (ref 1–4)
GFR AFRICAN AMERICAN: >60 ML/MIN
GFR NON-AFRICAN AMERICAN: >60 ML/MIN
GFR SERPL CREATININE-BSD FRML MDRD: ABNORMAL ML/MIN/{1.73_M2}
GFR SERPL CREATININE-BSD FRML MDRD: ABNORMAL ML/MIN/{1.73_M2}
GLUCOSE BLD-MCNC: 131 MG/DL (ref 70–99)
HCT VFR BLD CALC: 47.6 % (ref 40.7–50.3)
HDLC SERPL-MCNC: 36 MG/DL
HEMOGLOBIN: 15.3 G/DL (ref 13–17)
IMMATURE GRANULOCYTES: 1 %
LDL CHOLESTEROL: 95 MG/DL (ref 0–130)
LYMPHOCYTES # BLD: 29 % (ref 24–43)
MAGNESIUM: 2.1 MG/DL (ref 1.6–2.6)
MCH RBC QN AUTO: 30.8 PG (ref 25.2–33.5)
MCHC RBC AUTO-ENTMCNC: 32.1 G/DL (ref 28.4–34.8)
MCV RBC AUTO: 95.8 FL (ref 82.6–102.9)
MONOCYTES # BLD: 9 % (ref 3–12)
NRBC AUTOMATED: 0 PER 100 WBC
PDW BLD-RTO: 13 % (ref 11.8–14.4)
PLATELET # BLD: 229 K/UL (ref 138–453)
PLATELET ESTIMATE: ABNORMAL
PMV BLD AUTO: 11.6 FL (ref 8.1–13.5)
POTASSIUM SERPL-SCNC: 4.6 MMOL/L (ref 3.7–5.3)
RBC # BLD: 4.97 M/UL (ref 4.21–5.77)
RBC # BLD: ABNORMAL 10*6/UL
SEG NEUTROPHILS: 55 % (ref 36–65)
SEGMENTED NEUTROPHILS ABSOLUTE COUNT: 3.03 K/UL (ref 1.5–8.1)
SODIUM BLD-SCNC: 141 MMOL/L (ref 135–144)
TOTAL PROTEIN: 7.4 G/DL (ref 6.4–8.3)
TRIGL SERPL-MCNC: 374 MG/DL
TSH SERPL DL<=0.05 MIU/L-ACNC: 2.97 MIU/L (ref 0.3–5)
VLDLC SERPL CALC-MCNC: ABNORMAL MG/DL (ref 1–30)
WBC # BLD: 5.5 K/UL (ref 3.5–11.3)
WBC # BLD: ABNORMAL 10*3/UL

## 2019-09-14 PROCEDURE — 84443 ASSAY THYROID STIM HORMONE: CPT

## 2019-09-14 PROCEDURE — 80061 LIPID PANEL: CPT

## 2019-09-14 PROCEDURE — 80053 COMPREHEN METABOLIC PANEL: CPT

## 2019-09-14 PROCEDURE — 36415 COLL VENOUS BLD VENIPUNCTURE: CPT

## 2019-09-14 PROCEDURE — 85025 COMPLETE CBC W/AUTO DIFF WBC: CPT

## 2019-09-14 PROCEDURE — 83735 ASSAY OF MAGNESIUM: CPT

## 2019-09-20 DIAGNOSIS — R73.01 IFG (IMPAIRED FASTING GLUCOSE): Primary | Chronic | ICD-10-CM

## 2019-10-13 ENCOUNTER — HOSPITAL ENCOUNTER (OUTPATIENT)
Age: 59
Discharge: HOME | End: 2019-10-13
Payer: COMMERCIAL

## 2019-10-13 PROCEDURE — 93005 ELECTROCARDIOGRAM TRACING: CPT | Performed by: PLASTIC SURGERY

## 2019-10-15 LAB
EKG ATRIAL RATE: 67 BPM
EKG P AXIS: 58 DEGREES
EKG P-R INTERVAL: 178 MS
EKG Q-T INTERVAL: 396 MS
EKG QRS DURATION: 86 MS
EKG QTC CALCULATION (BAZETT): 418 MS
EKG R AXIS: 55 DEGREES
EKG T AXIS: 69 DEGREES
EKG VENTRICULAR RATE: 67 BPM

## 2019-10-15 PROCEDURE — 93010 ELECTROCARDIOGRAM REPORT: CPT | Performed by: INTERNAL MEDICINE

## 2019-11-26 ENCOUNTER — HOSPITAL ENCOUNTER (OUTPATIENT)
Age: 59
Setting detail: SPECIMEN
Discharge: HOME | End: 2019-11-26
Payer: COMMERCIAL

## 2019-11-29 LAB — DERMATOLOGY PATHOLOGY REPORT: NORMAL
